# Patient Record
Sex: FEMALE | Race: OTHER | Employment: PART TIME | URBAN - METROPOLITAN AREA
[De-identification: names, ages, dates, MRNs, and addresses within clinical notes are randomized per-mention and may not be internally consistent; named-entity substitution may affect disease eponyms.]

---

## 2024-05-22 DIAGNOSIS — O12.10 PROTEINURIA IN PREGNANCY, ANTEPARTUM: ICD-10-CM

## 2024-05-22 LAB
CREAT UR-MCNC: 316 MG/DL (ref 28–217)
PROT UR-MCNC: 52 MG/DL
PROT/CREAT UR-RTO: 0.2

## 2024-05-23 PROBLEM — O09.93 HIGH-RISK PREGNANCY IN THIRD TRIMESTER: Status: ACTIVE | Noted: 2024-01-16

## 2024-05-27 ENCOUNTER — HOSPITAL ENCOUNTER (INPATIENT)
Age: 24
LOS: 3 days | Discharge: HOME | DRG: 540 | End: 2024-05-30
Attending: OBSTETRICS & GYNECOLOGY | Admitting: OBSTETRICS & GYNECOLOGY
Payer: COMMERCIAL

## 2024-05-27 PROBLEM — Z3A.39 39 WEEKS GESTATION OF PREGNANCY: Status: ACTIVE | Noted: 2024-05-27

## 2024-05-27 PROBLEM — O36.63X0 EXCESSIVE FETAL GROWTH AFFECTING MANAGEMENT OF PREGNANCY IN THIRD TRIMESTER: Status: ACTIVE | Noted: 2024-05-27

## 2024-05-27 LAB
BASOPHILS # BLD: 0 K/UL (ref 0–0.2)
BASOPHILS NFR BLD: 0 % (ref 0–2)
DIFFERENTIAL METHOD BLD: ABNORMAL
EOSINOPHIL # BLD: 0.1 K/UL (ref 0–0.8)
EOSINOPHIL NFR BLD: 1 % (ref 0.5–7.8)
ERYTHROCYTE [DISTWIDTH] IN BLOOD BY AUTOMATED COUNT: 17.6 % (ref 11.9–14.6)
HCT VFR BLD AUTO: 35.3 % (ref 35.8–46.3)
HGB BLD-MCNC: 11.3 G/DL (ref 11.7–15.4)
IMM GRANULOCYTES # BLD AUTO: 0.1 K/UL (ref 0–0.5)
IMM GRANULOCYTES NFR BLD AUTO: 1 % (ref 0–5)
LYMPHOCYTES # BLD: 2.1 K/UL (ref 0.5–4.6)
LYMPHOCYTES NFR BLD: 20 % (ref 13–44)
MCH RBC QN AUTO: 25.9 PG (ref 26.1–32.9)
MCHC RBC AUTO-ENTMCNC: 32 G/DL (ref 31.4–35)
MCV RBC AUTO: 81 FL (ref 82–102)
MONOCYTES # BLD: 0.9 K/UL (ref 0.1–1.3)
MONOCYTES NFR BLD: 8 % (ref 4–12)
NEUTS SEG # BLD: 7.3 K/UL (ref 1.7–8.2)
NEUTS SEG NFR BLD: 70 % (ref 43–78)
NRBC # BLD: 0.03 K/UL (ref 0–0.2)
PLATELET # BLD AUTO: 247 K/UL (ref 150–450)
PMV BLD AUTO: 9.8 FL (ref 9.4–12.3)
RBC # BLD AUTO: 4.36 M/UL (ref 4.05–5.2)
WBC # BLD AUTO: 10.5 K/UL (ref 4.3–11.1)

## 2024-05-27 PROCEDURE — 2580000003 HC RX 258: Performed by: OBSTETRICS & GYNECOLOGY

## 2024-05-27 PROCEDURE — 3E033VJ INTRODUCTION OF OTHER HORMONE INTO PERIPHERAL VEIN, PERCUTANEOUS APPROACH: ICD-10-PCS | Performed by: OBSTETRICS & GYNECOLOGY

## 2024-05-27 PROCEDURE — 85025 COMPLETE CBC W/AUTO DIFF WBC: CPT

## 2024-05-27 PROCEDURE — 6360000002 HC RX W HCPCS: Performed by: OBSTETRICS & GYNECOLOGY

## 2024-05-27 PROCEDURE — 59200 INSERT CERVICAL DILATOR: CPT

## 2024-05-27 PROCEDURE — 6370000000 HC RX 637 (ALT 250 FOR IP): Performed by: OBSTETRICS & GYNECOLOGY

## 2024-05-27 PROCEDURE — 86901 BLOOD TYPING SEROLOGIC RH(D): CPT

## 2024-05-27 PROCEDURE — 86850 RBC ANTIBODY SCREEN: CPT

## 2024-05-27 PROCEDURE — 86780 TREPONEMA PALLIDUM: CPT

## 2024-05-27 PROCEDURE — 1100000000 HC RM PRIVATE

## 2024-05-27 PROCEDURE — 86900 BLOOD TYPING SEROLOGIC ABO: CPT

## 2024-05-27 RX ORDER — SODIUM CHLORIDE 0.9 % (FLUSH) 0.9 %
5-40 SYRINGE (ML) INJECTION PRN
Status: DISCONTINUED | OUTPATIENT
Start: 2024-05-27 | End: 2024-05-28

## 2024-05-27 RX ORDER — SODIUM CHLORIDE, SODIUM LACTATE, POTASSIUM CHLORIDE, AND CALCIUM CHLORIDE .6; .31; .03; .02 G/100ML; G/100ML; G/100ML; G/100ML
1000 INJECTION, SOLUTION INTRAVENOUS PRN
Status: DISCONTINUED | OUTPATIENT
Start: 2024-05-27 | End: 2024-05-28

## 2024-05-27 RX ORDER — ONDANSETRON 4 MG/1
4 TABLET, ORALLY DISINTEGRATING ORAL EVERY 6 HOURS PRN
Status: DISCONTINUED | OUTPATIENT
Start: 2024-05-27 | End: 2024-05-29

## 2024-05-27 RX ORDER — SODIUM CHLORIDE 0.9 % (FLUSH) 0.9 %
5-40 SYRINGE (ML) INJECTION EVERY 12 HOURS SCHEDULED
Status: DISCONTINUED | OUTPATIENT
Start: 2024-05-27 | End: 2024-05-28

## 2024-05-27 RX ORDER — DEXTROSE, SODIUM CHLORIDE, SODIUM LACTATE, POTASSIUM CHLORIDE, AND CALCIUM CHLORIDE 5; .6; .31; .03; .02 G/100ML; G/100ML; G/100ML; G/100ML; G/100ML
INJECTION, SOLUTION INTRAVENOUS CONTINUOUS
Status: DISCONTINUED | OUTPATIENT
Start: 2024-05-27 | End: 2024-05-28

## 2024-05-27 RX ORDER — SODIUM CHLORIDE, SODIUM LACTATE, POTASSIUM CHLORIDE, AND CALCIUM CHLORIDE .6; .31; .03; .02 G/100ML; G/100ML; G/100ML; G/100ML
500 INJECTION, SOLUTION INTRAVENOUS PRN
Status: DISCONTINUED | OUTPATIENT
Start: 2024-05-27 | End: 2024-05-28

## 2024-05-27 RX ORDER — TERBUTALINE SULFATE 1 MG/ML
0.25 INJECTION, SOLUTION SUBCUTANEOUS ONCE
Status: DISCONTINUED | OUTPATIENT
Start: 2024-05-27 | End: 2024-05-28

## 2024-05-27 RX ORDER — ONDANSETRON 2 MG/ML
4 INJECTION INTRAMUSCULAR; INTRAVENOUS EVERY 6 HOURS PRN
Status: DISCONTINUED | OUTPATIENT
Start: 2024-05-27 | End: 2024-05-28

## 2024-05-27 RX ORDER — SODIUM CHLORIDE 9 MG/ML
25 INJECTION, SOLUTION INTRAVENOUS PRN
Status: DISCONTINUED | OUTPATIENT
Start: 2024-05-27 | End: 2024-05-28

## 2024-05-27 RX ADMIN — Medication 50 MCG: at 23:41

## 2024-05-27 RX ADMIN — SODIUM CHLORIDE 5 MILLION UNITS: 900 INJECTION INTRAVENOUS at 23:40

## 2024-05-27 RX ADMIN — SODIUM CHLORIDE, SODIUM LACTATE, POTASSIUM CHLORIDE, CALCIUM CHLORIDE AND DEXTROSE MONOHYDRATE: 5; 600; 310; 30; 20 INJECTION, SOLUTION INTRAVENOUS at 23:39

## 2024-05-28 ENCOUNTER — ANESTHESIA EVENT (OUTPATIENT)
Dept: OPERATING ROOM | Age: 24
DRG: 540 | End: 2024-05-28
Payer: COMMERCIAL

## 2024-05-28 ENCOUNTER — ANESTHESIA (OUTPATIENT)
Dept: OPERATING ROOM | Age: 24
DRG: 540 | End: 2024-05-28
Payer: COMMERCIAL

## 2024-05-28 LAB
ABO + RH BLD: NORMAL
BLOOD GROUP ANTIBODIES SERPL: NORMAL
SPECIMEN EXP DATE BLD: NORMAL
T PALLIDUM AB SER QL IA: NONREACTIVE

## 2024-05-28 PROCEDURE — 2580000003 HC RX 258: Performed by: OBSTETRICS & GYNECOLOGY

## 2024-05-28 PROCEDURE — 2580000003 HC RX 258: Performed by: ANESTHESIOLOGY

## 2024-05-28 PROCEDURE — 36415 COLL VENOUS BLD VENIPUNCTURE: CPT

## 2024-05-28 PROCEDURE — 7100000001 HC PACU RECOVERY - ADDTL 15 MIN: Performed by: OBSTETRICS & GYNECOLOGY

## 2024-05-28 PROCEDURE — 7210000100 HC LABOR FEE PER 1 HR

## 2024-05-28 PROCEDURE — 2500000003 HC RX 250 WO HCPCS

## 2024-05-28 PROCEDURE — 6370000000 HC RX 637 (ALT 250 FOR IP): Performed by: ANESTHESIOLOGY

## 2024-05-28 PROCEDURE — 59514 CESAREAN DELIVERY ONLY: CPT | Performed by: OBSTETRICS & GYNECOLOGY

## 2024-05-28 PROCEDURE — 1100000000 HC RM PRIVATE

## 2024-05-28 PROCEDURE — 6360000002 HC RX W HCPCS

## 2024-05-28 PROCEDURE — 6370000000 HC RX 637 (ALT 250 FOR IP): Performed by: OBSTETRICS & GYNECOLOGY

## 2024-05-28 PROCEDURE — 6360000002 HC RX W HCPCS: Performed by: ANESTHESIOLOGY

## 2024-05-28 PROCEDURE — 7100000000 HC PACU RECOVERY - FIRST 15 MIN: Performed by: OBSTETRICS & GYNECOLOGY

## 2024-05-28 PROCEDURE — 2580000003 HC RX 258

## 2024-05-28 PROCEDURE — 3700000000 HC ANESTHESIA ATTENDED CARE: Performed by: OBSTETRICS & GYNECOLOGY

## 2024-05-28 PROCEDURE — 6360000002 HC RX W HCPCS: Performed by: OBSTETRICS & GYNECOLOGY

## 2024-05-28 PROCEDURE — 10907ZC DRAINAGE OF AMNIOTIC FLUID, THERAPEUTIC FROM PRODUCTS OF CONCEPTION, VIA NATURAL OR ARTIFICIAL OPENING: ICD-10-PCS | Performed by: OBSTETRICS & GYNECOLOGY

## 2024-05-28 PROCEDURE — 2709999900 HC NON-CHARGEABLE SUPPLY: Performed by: OBSTETRICS & GYNECOLOGY

## 2024-05-28 PROCEDURE — 3609079900 HC CESAREAN SECTION: Performed by: OBSTETRICS & GYNECOLOGY

## 2024-05-28 PROCEDURE — 3700000001 HC ADD 15 MINUTES (ANESTHESIA): Performed by: OBSTETRICS & GYNECOLOGY

## 2024-05-28 PROCEDURE — 87255 GENET VIRUS ISOLATE HSV: CPT

## 2024-05-28 RX ORDER — SODIUM CHLORIDE 0.9 % (FLUSH) 0.9 %
5-40 SYRINGE (ML) INJECTION EVERY 12 HOURS SCHEDULED
Status: DISCONTINUED | OUTPATIENT
Start: 2024-05-28 | End: 2024-05-28 | Stop reason: HOSPADM

## 2024-05-28 RX ORDER — ONDANSETRON 2 MG/ML
4 INJECTION INTRAMUSCULAR; INTRAVENOUS ONCE
Status: COMPLETED | OUTPATIENT
Start: 2024-05-28 | End: 2024-05-28

## 2024-05-28 RX ORDER — SODIUM CHLORIDE 0.9 % (FLUSH) 0.9 %
5-40 SYRINGE (ML) INJECTION EVERY 12 HOURS SCHEDULED
Status: DISCONTINUED | OUTPATIENT
Start: 2024-05-28 | End: 2024-05-28

## 2024-05-28 RX ORDER — SODIUM CHLORIDE 0.9 % (FLUSH) 0.9 %
5-40 SYRINGE (ML) INJECTION EVERY 12 HOURS SCHEDULED
Status: DISCONTINUED | OUTPATIENT
Start: 2024-05-28 | End: 2024-05-30 | Stop reason: HOSPADM

## 2024-05-28 RX ORDER — HYDROMORPHONE HYDROCHLORIDE 1 MG/ML
1 INJECTION, SOLUTION INTRAMUSCULAR; INTRAVENOUS; SUBCUTANEOUS EVERY 4 HOURS PRN
Status: DISPENSED | OUTPATIENT
Start: 2024-05-28 | End: 2024-05-29

## 2024-05-28 RX ORDER — LANOLIN
CREAM (ML) TOPICAL
Status: DISCONTINUED | OUTPATIENT
Start: 2024-05-28 | End: 2024-05-30 | Stop reason: HOSPADM

## 2024-05-28 RX ORDER — SODIUM CHLORIDE, SODIUM LACTATE, POTASSIUM CHLORIDE, CALCIUM CHLORIDE 600; 310; 30; 20 MG/100ML; MG/100ML; MG/100ML; MG/100ML
INJECTION, SOLUTION INTRAVENOUS CONTINUOUS
Status: DISCONTINUED | OUTPATIENT
Start: 2024-05-28 | End: 2024-05-28 | Stop reason: HOSPADM

## 2024-05-28 RX ORDER — NALOXONE HYDROCHLORIDE 0.4 MG/ML
INJECTION, SOLUTION INTRAMUSCULAR; INTRAVENOUS; SUBCUTANEOUS PRN
Status: DISCONTINUED | OUTPATIENT
Start: 2024-05-28 | End: 2024-05-30 | Stop reason: HOSPADM

## 2024-05-28 RX ORDER — BUPIVACAINE HYDROCHLORIDE 7.5 MG/ML
INJECTION, SOLUTION INTRASPINAL
Status: COMPLETED | OUTPATIENT
Start: 2024-05-28 | End: 2024-05-28

## 2024-05-28 RX ORDER — LIDOCAINE HYDROCHLORIDE 10 MG/ML
1 INJECTION, SOLUTION INFILTRATION; PERINEURAL
Status: DISCONTINUED | OUTPATIENT
Start: 2024-05-28 | End: 2024-05-28 | Stop reason: HOSPADM

## 2024-05-28 RX ORDER — SODIUM CHLORIDE, SODIUM LACTATE, POTASSIUM CHLORIDE, CALCIUM CHLORIDE 600; 310; 30; 20 MG/100ML; MG/100ML; MG/100ML; MG/100ML
INJECTION, SOLUTION INTRAVENOUS CONTINUOUS
Status: DISCONTINUED | OUTPATIENT
Start: 2024-05-28 | End: 2024-05-30 | Stop reason: HOSPADM

## 2024-05-28 RX ORDER — ONDANSETRON 2 MG/ML
4 INJECTION INTRAMUSCULAR; INTRAVENOUS EVERY 6 HOURS PRN
Status: DISPENSED | OUTPATIENT
Start: 2024-05-28 | End: 2024-05-29

## 2024-05-28 RX ORDER — CARBOPROST TROMETHAMINE 250 UG/ML
250 INJECTION, SOLUTION INTRAMUSCULAR PRN
Status: DISCONTINUED | OUTPATIENT
Start: 2024-05-28 | End: 2024-05-28

## 2024-05-28 RX ORDER — KETOROLAC TROMETHAMINE 30 MG/ML
INJECTION, SOLUTION INTRAMUSCULAR; INTRAVENOUS PRN
Status: DISCONTINUED | OUTPATIENT
Start: 2024-05-28 | End: 2024-05-28 | Stop reason: SDUPTHER

## 2024-05-28 RX ORDER — CITRIC ACID/SODIUM CITRATE 334-500MG
30 SOLUTION, ORAL ORAL ONCE
Status: COMPLETED | OUTPATIENT
Start: 2024-05-28 | End: 2024-05-28

## 2024-05-28 RX ORDER — KETOROLAC TROMETHAMINE 30 MG/ML
30 INJECTION, SOLUTION INTRAMUSCULAR; INTRAVENOUS EVERY 6 HOURS PRN
Status: DISPENSED | OUTPATIENT
Start: 2024-05-28 | End: 2024-05-29

## 2024-05-28 RX ORDER — SODIUM CHLORIDE 0.9 % (FLUSH) 0.9 %
5-40 SYRINGE (ML) INJECTION PRN
Status: DISCONTINUED | OUTPATIENT
Start: 2024-05-28 | End: 2024-05-28 | Stop reason: HOSPADM

## 2024-05-28 RX ORDER — MORPHINE SULFATE 0.5 MG/ML
INJECTION, SOLUTION EPIDURAL; INTRATHECAL; INTRAVENOUS
Status: COMPLETED | OUTPATIENT
Start: 2024-05-28 | End: 2024-05-28

## 2024-05-28 RX ORDER — OXYCODONE HYDROCHLORIDE 5 MG/1
5 TABLET ORAL EVERY 6 HOURS PRN
Status: DISPENSED | OUTPATIENT
Start: 2024-05-28 | End: 2024-05-29

## 2024-05-28 RX ORDER — ACETAMINOPHEN 325 MG/1
650 TABLET ORAL EVERY 6 HOURS PRN
Status: DISPENSED | OUTPATIENT
Start: 2024-05-28 | End: 2024-05-29

## 2024-05-28 RX ORDER — DIPHENHYDRAMINE HYDROCHLORIDE 50 MG/ML
12.5 INJECTION INTRAMUSCULAR; INTRAVENOUS EVERY 6 HOURS PRN
Status: ACTIVE | OUTPATIENT
Start: 2024-05-28 | End: 2024-05-29

## 2024-05-28 RX ORDER — PROCHLORPERAZINE EDISYLATE 5 MG/ML
5 INJECTION INTRAMUSCULAR; INTRAVENOUS EVERY 6 HOURS PRN
Status: DISPENSED | OUTPATIENT
Start: 2024-05-28 | End: 2024-05-29

## 2024-05-28 RX ORDER — SODIUM CHLORIDE 0.9 % (FLUSH) 0.9 %
5-40 SYRINGE (ML) INJECTION PRN
Status: DISCONTINUED | OUTPATIENT
Start: 2024-05-28 | End: 2024-05-28

## 2024-05-28 RX ORDER — SODIUM CHLORIDE 9 MG/ML
INJECTION, SOLUTION INTRAVENOUS PRN
Status: DISCONTINUED | OUTPATIENT
Start: 2024-05-28 | End: 2024-05-28 | Stop reason: HOSPADM

## 2024-05-28 RX ORDER — SODIUM CHLORIDE 9 MG/ML
INJECTION, SOLUTION INTRAVENOUS PRN
Status: DISCONTINUED | OUTPATIENT
Start: 2024-05-28 | End: 2024-05-28

## 2024-05-28 RX ORDER — MISOPROSTOL 200 UG/1
400 TABLET ORAL PRN
Status: DISCONTINUED | OUTPATIENT
Start: 2024-05-28 | End: 2024-05-30 | Stop reason: HOSPADM

## 2024-05-28 RX ORDER — SODIUM CHLORIDE 0.9 % (FLUSH) 0.9 %
5-40 SYRINGE (ML) INJECTION PRN
Status: DISCONTINUED | OUTPATIENT
Start: 2024-05-28 | End: 2024-05-30 | Stop reason: HOSPADM

## 2024-05-28 RX ORDER — METHYLERGONOVINE MALEATE 0.2 MG/ML
INJECTION INTRAVENOUS PRN
Status: DISCONTINUED | OUTPATIENT
Start: 2024-05-28 | End: 2024-05-28 | Stop reason: SDUPTHER

## 2024-05-28 RX ORDER — TRANEXAMIC ACID 10 MG/ML
1000 INJECTION, SOLUTION INTRAVENOUS
Status: DISPENSED | OUTPATIENT
Start: 2024-05-28 | End: 2024-05-29

## 2024-05-28 RX ORDER — EPHEDRINE SULFATE/0.9% NACL/PF 50 MG/5 ML
SYRINGE (ML) INTRAVENOUS PRN
Status: DISCONTINUED | OUTPATIENT
Start: 2024-05-28 | End: 2024-05-28 | Stop reason: SDUPTHER

## 2024-05-28 RX ORDER — OXYCODONE HYDROCHLORIDE 5 MG/1
10 TABLET ORAL PRN
Status: ACTIVE | OUTPATIENT
Start: 2024-05-28 | End: 2024-05-29

## 2024-05-28 RX ORDER — SODIUM CHLORIDE, SODIUM LACTATE, POTASSIUM CHLORIDE, CALCIUM CHLORIDE 600; 310; 30; 20 MG/100ML; MG/100ML; MG/100ML; MG/100ML
INJECTION, SOLUTION INTRAVENOUS CONTINUOUS
Status: DISCONTINUED | OUTPATIENT
Start: 2024-05-28 | End: 2024-05-28

## 2024-05-28 RX ORDER — METHYLERGONOVINE MALEATE 0.2 MG/ML
INJECTION INTRAVENOUS PRN
Status: DISCONTINUED | OUTPATIENT
Start: 2024-05-28 | End: 2024-05-28

## 2024-05-28 RX ADMIN — ACETAMINOPHEN 650 MG: 325 TABLET, FILM COATED ORAL at 22:14

## 2024-05-28 RX ADMIN — SODIUM CITRATE AND CITRIC ACID MONOHYDRATE 30 ML: 334; 500 SOLUTION ORAL at 11:08

## 2024-05-28 RX ADMIN — CEFAZOLIN 2000 MG: 10 INJECTION, POWDER, FOR SOLUTION INTRAVENOUS at 11:08

## 2024-05-28 RX ADMIN — Medication 10 MG: at 12:12

## 2024-05-28 RX ADMIN — BUPIVACAINE HYDROCHLORIDE IN DEXTROSE 12 MG: 7.5 INJECTION, SOLUTION SUBARACHNOID at 11:38

## 2024-05-28 RX ADMIN — SODIUM CHLORIDE, SODIUM LACTATE, POTASSIUM CHLORIDE, AND CALCIUM CHLORIDE: 600; 310; 30; 20 INJECTION, SOLUTION INTRAVENOUS at 11:29

## 2024-05-28 RX ADMIN — SODIUM CHLORIDE, SODIUM LACTATE, POTASSIUM CHLORIDE, CALCIUM CHLORIDE AND DEXTROSE MONOHYDRATE 125 ML/HR: 5; 600; 310; 30; 20 INJECTION, SOLUTION INTRAVENOUS at 07:57

## 2024-05-28 RX ADMIN — Medication 10 MG: at 11:44

## 2024-05-28 RX ADMIN — OXYTOCIN 1 MILLI-UNITS/MIN: 10 INJECTION, SOLUTION INTRAMUSCULAR; INTRAVENOUS at 07:59

## 2024-05-28 RX ADMIN — Medication 10 MG: at 12:18

## 2024-05-28 RX ADMIN — Medication 50 MCG: at 03:56

## 2024-05-28 RX ADMIN — KETOROLAC TROMETHAMINE 30 MG: 30 INJECTION, SOLUTION INTRAMUSCULAR; INTRAVENOUS at 12:20

## 2024-05-28 RX ADMIN — Medication 10 MG: at 11:57

## 2024-05-28 RX ADMIN — HYDROMORPHONE HYDROCHLORIDE 1 MG: 1 INJECTION, SOLUTION INTRAMUSCULAR; INTRAVENOUS; SUBCUTANEOUS at 14:20

## 2024-05-28 RX ADMIN — PHENYLEPHRINE HYDROCHLORIDE 100 MCG: 10 INJECTION INTRAVENOUS at 12:12

## 2024-05-28 RX ADMIN — PHENYLEPHRINE HYDROCHLORIDE 100 MCG: 10 INJECTION INTRAVENOUS at 12:18

## 2024-05-28 RX ADMIN — Medication 10 MG: at 11:48

## 2024-05-28 RX ADMIN — ONDANSETRON 4 MG: 2 INJECTION INTRAMUSCULAR; INTRAVENOUS at 11:08

## 2024-05-28 RX ADMIN — PROCHLORPERAZINE EDISYLATE 5 MG: 5 INJECTION INTRAMUSCULAR; INTRAVENOUS at 15:26

## 2024-05-28 RX ADMIN — Medication 500 ML/HR: at 12:01

## 2024-05-28 RX ADMIN — METHYLERGONOVINE MALEATE 200 MCG: 0.2 INJECTION, SOLUTION INTRAMUSCULAR; INTRAVENOUS at 12:04

## 2024-05-28 RX ADMIN — SODIUM CHLORIDE 2.5 MILLION UNITS: 9 INJECTION, SOLUTION INTRAVENOUS at 03:50

## 2024-05-28 RX ADMIN — ACETAMINOPHEN 650 MG: 325 TABLET, FILM COATED ORAL at 16:44

## 2024-05-28 RX ADMIN — MISOPROSTOL 400 MCG: 200 TABLET ORAL at 12:07

## 2024-05-28 RX ADMIN — SODIUM CHLORIDE, POTASSIUM CHLORIDE, SODIUM LACTATE AND CALCIUM CHLORIDE 125 ML/HR: 600; 310; 30; 20 INJECTION, SOLUTION INTRAVENOUS at 13:00

## 2024-05-28 RX ADMIN — PHENYLEPHRINE HYDROCHLORIDE 100 MCG: 10 INJECTION INTRAVENOUS at 11:57

## 2024-05-28 RX ADMIN — SODIUM CHLORIDE 2.5 MILLION UNITS: 9 INJECTION, SOLUTION INTRAVENOUS at 07:56

## 2024-05-28 RX ADMIN — MORPHINE SULFATE 0.15 MG: 0.5 INJECTION, SOLUTION EPIDURAL; INTRATHECAL; INTRAVENOUS at 11:38

## 2024-05-28 RX ADMIN — KETOROLAC TROMETHAMINE 30 MG: 30 INJECTION, SOLUTION INTRAMUSCULAR at 18:00

## 2024-05-28 RX ADMIN — Medication 10 MG: at 11:47

## 2024-05-28 ASSESSMENT — PAIN DESCRIPTION - LOCATION
LOCATION: ABDOMEN;INCISION
LOCATION: ABDOMEN
LOCATION: ABDOMEN

## 2024-05-28 ASSESSMENT — PAIN SCALES - GENERAL
PAINLEVEL_OUTOF10: 4
PAINLEVEL_OUTOF10: 5
PAINLEVEL_OUTOF10: 5
PAINLEVEL_OUTOF10: 3

## 2024-05-28 ASSESSMENT — PAIN DESCRIPTION - DESCRIPTORS
DESCRIPTORS: ACHING
DESCRIPTORS: SORE
DESCRIPTORS: BURNING;CRAMPING

## 2024-05-28 ASSESSMENT — PAIN DESCRIPTION - ORIENTATION
ORIENTATION: ANTERIOR;LOWER
ORIENTATION: ANTERIOR;LOWER

## 2024-05-28 NOTE — PROGRESS NOTES
Patient/caregivers speak Khmer  as their preferred language for their healthcare communication. For safe communication, use the [facility]  carts or call:     Senior /Navigator Carole Dong at [phone] or   [facility] phone services for [facility] at [phone]          Thank you,           Carole MCKAY  Senior /Navigator

## 2024-05-28 NOTE — PLAN OF CARE
Problem: Vaginal Birth or  Section  Goal: Fetal and maternal status remain reassuring during the birth process  Description:  Birth OB-Pregnancy care plan goal which identifies if the fetal and maternal status remain reassuring during the birth process  Outcome: Progressing     Problem: Pain  Goal: Verbalizes/displays adequate comfort level or baseline comfort level  Outcome: Progressing     Problem: Infection - Adult  Goal: Absence of infection during hospitalization  Outcome: Progressing  Goal: Absence of fever/infection during anticipated neutropenic period  Outcome: Progressing     Problem: Safety - Adult  Goal: Free from fall injury  Outcome: Progressing     Problem: Chronic Conditions and Co-morbidities  Goal: Patient's chronic conditions and co-morbidity symptoms are monitored and maintained or improved  Outcome: Progressing     
Discharge Planning  Goal: Discharge to home or other facility with appropriate resources  Outcome: Progressing     Problem: Chronic Conditions and Co-morbidities  Goal: Patient's chronic conditions and co-morbidity symptoms are monitored and maintained or improved  5/28/2024 1005 by Cyndi Carmona, RN  Outcome: Progressing  5/27/2024 2237 by Sana Garcia, RN  Outcome: Progressing

## 2024-05-28 NOTE — ANESTHESIA POSTPROCEDURE EVALUATION
Department of Anesthesiology  Postprocedure Note    Patient: Magnolia Caceres  MRN: [id number]  YOB: 2000  Date of evaluation: 2024    Procedure Summary       Date: 24 Room / Location: [facility] L&D OR  [facility] L&D    Anesthesia Start: 1129 Anesthesia Stop: 1235    Procedure:  SECTION (Abdomen) Diagnosis:        delivery, delivered, current hospitalization      ( delivery, delivered, current hospitalization [O82])    Surgeons: Gomez Song MD Responsible Provider: Tesfaye Wilhelm MD    Anesthesia Type: spinal ASA Status: 2            Anesthesia Type: No value filed.    Debbie Phase I: Debbie Score: 9    Debbie Phase II: Debbie Score: 10    Anesthesia Post Evaluation    Patient location during evaluation: floor  Patient participation: complete - patient participated  Level of consciousness: awake and alert  Airway patency: patent  Nausea & Vomiting: no nausea and no vomiting  Cardiovascular status: hemodynamically stable  Respiratory status: acceptable, nonlabored ventilation and spontaneous ventilation  Hydration status: euvolemic  Comments: BP (!) 125/91   Pulse 73   Temp 98.2 °F (36.8 °C) (Oral)   Resp 18   SpO2 98%     Multimodal analgesia pain management approach  Pain management: adequate and satisfactory to patient        No notable events documented.

## 2024-05-28 NOTE — PROGRESS NOTES
Dr. Song at bedside discussing plan of care. Lesion found on labia during SVE per provider. Patient currently being educated and discussion being made on c/s via interpretor. Patient agrees with plan of care to have c/s, due to unknown lesion at this time. All questions and concerns addressed.

## 2024-05-28 NOTE — ANESTHESIA PROCEDURE NOTES
Spinal Block    Patient location during procedure: OR  End time: 5/28/2024 11:43 AM  Reason for block: primary anesthetic  Staffing  Performed: anesthesiologist   Anesthesiologist: Tesfaye Wilhelm MD  Performed by: Tesfaye Wilhelm MD  Authorized by: Tesfaye Wilhelm MD    Spinal Block  Patient position: sitting  Prep: ChloraPrep  Patient monitoring: cardiac monitor, continuous pulse ox, frequent blood pressure checks and oxygen  Approach: midline  Location: L4/L5  Provider prep: mask and sterile gloves  Needle  Needle type: pencil-tip   Needle gauge: 25 G  Needle length: 3.5 in  Assessment  Sensory level: T8  Swirl obtained: Yes  CSF: clear  Attempts: 1  Hemodynamics: stable  Additional Notes  Time out at 1138  Preanesthetic Checklist  Completed: patient identified, IV checked, risks and benefits discussed, surgical/procedural consents, equipment checked, pre-op evaluation, timeout performed, anesthesia consent given, oxygen available and monitors applied/VS acknowledged

## 2024-05-28 NOTE — OP NOTE
[facility]  [address], [place] [address]  [phone]    Gomez Song MD, FACOG  Abena Victoria [facility]  Lorena Heck MD, FACOG    Magnolia Caceres  2000    Preop Dx:   1. IUP @ 39 2/7 weeks gestation   2. 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  Vtx position.  APGARS 8,9.  Weight:  10 lbs, 6 oz.  Normal appearing uterus, tubes and ovaries.    Procedure:  Pt was taken to the operating room where spinal anesthesia was found to be adequate.  She was then prepped and draped in the usual sterile fashion and placed in the supine position with a leftward tilt.  A Pfannenstiel skin incision was made with the scalpel and carried down to the underlying layer of fascia with the bovie.  The fascia was incised in the midline and the incision extended laterally with the booker scissors. Superior of the fascial incision was grasped with Kocher clamps and  from the rectus muscles sharply and bluntly.  In a similar fashion, the inferior aspect of the fascial incision was grasped with the Kocher clamps and  from the rectus muscles sharply and bluntly.  The rectus muscles were  in the midline bluntly and peritoneum entered bluntly.  The peritoneal incision was extended manually.  Bladder blade was inserted and lower uterine incision made with the scalpel.  Incision extended manually laterally.  Infants head delivered atraumatically.  Nose and mouth suctioned.  Cord clamped and cut.  Infant handed off to the waiting NICU staff.  The uterus was exteriorized and cleared of all clots and debris.  Hysterotomy was closed with 0-vicryl in a running, locking fashion.  A second layer of 0-vicryl was placed in a interrupted figure of 8 fashion to imbricate the incision.  The pelvis and gutters were cleared

## 2024-05-28 NOTE — L&D DELIVERY NOTE
Micheal Caceres [[id number]]      Labor Events     Labor: No   Steroids: None  Cervical Ripening Date/Time:  24 23:41:00   Cervical Ripening Type: Misoprostol  Antibiotics Received during Labor: Yes  Rupture Date/Time:  24 11:59:29   Rupture Type: AROM  Fluid Color: Clear  Fluid Odor: None  Fluid Volume: Moderate              Anesthesia    Method: Spinal       Delivery Details      Delivery Date: 24 Delivery Time: 12:00:00   Delivery Type: , Low Transverse  Trial of Labor?: Yes   Categorization: Primary   Priority: unscheduled              Shoulder Dystocia    Shoulder Dystocia Present?: No       Cord    Vessels: 3 Vessels  Complications: None  Cord Clamped Date/Time: 2024 12:00:59  Cord Blood Disposition: Lab              Placenta    Date/Time: 2024 12:01:00  Removal: Manual Removal  Appearance: Intact  Disposition: Discarded       Lacerations           Blood Loss  Mother: Magnolia Caceres #[id number]     Start of Mother's Information      Delivery Blood Loss  24 1133 - 24 1221      None                 End of Mother's Information  Mother: Magnolia Caceres #[id number]                Delivery Providers    Delivering clinician:      Provider Role     Obstetrician     Primary Nurse     Primary Ledger Nurse     Charge Nurse     Scrub Tech              Ledger Assessment    Living Status: Living  Delivery Location Comment: [phone]        Skin Color:   Heart Rate:   Reflex Irritability:   Muscle Tone:   Respiratory Effort:   Total:            1 Minute:    0    2    2    2    2    8         5 Minute:    1    2    2    2    2    9                                        Apgars Assigned By: SAÚL OLMOS RN              Resuscitation    Method: Bulb Suction, Stimulation             Ledger Measurements      Birth Weight: 4710 g   Birth Length: 53 cm     Head Circumference: 36 cm     Chest Circumference: 37 cm

## 2024-05-28 NOTE — LACTATION NOTE
This note was copied from a baby's chart.  In to see mom and infant for first time. Mom having hard time staying awake during visit. Dad and mom speak Ecuadorean. Family member at bedside wanted to interpret instead of using computer as offered. Mom tried to latch baby after birth but infant was impatient per family and did not want to latch, baby has since then had formula 2 times. One of those time was just before entering room, baby took 15 mls. Mom's plan is to do both breast and bottle feed formula. Encouraged to offer breast first q 2-3 hrs, both sides, and then if infant still hungry or doesn't latch, can offer formula after per her plan. They had no questions or needs at this time. Lactation to follow up in am.

## 2024-05-28 NOTE — PROGRESS NOTES
[facility]  [address], [place] [address]  [phone]    Gomez Song MD, FACOG  Abena Victoria [facility]    Labor Progress Note    Pt tolerating induction/labor well.    Vitals:    05/28/24 0738 05/28/24 0802 05/28/24 0850 05/28/24 0903   BP: 117/70 128/76 135/65 (!) 113/58   Pulse: 83 78 87 85   Resp: 16      Temp: 98.5 °F (36.9 °C)      TempSrc: Oral      SpO2: 98%        FHT's:  Baseline 's, reactive  [place]:  ctx q 2-4 min    I presented to room to AROM patient.  Upon exam, ulcerated lesion noted on left mid labia.  With  (Guille #[id number]), I asked patient about HSV Hx.  Pt does not have known Hx.  She did report she gets these occasionally, they are painful but they eventually resolve.  Chart reviewed and no lab results noted.  SF lab called and no rapid testing available, only send out testing.  D/W pt at length that appears to be HSV on exam but I cannot confirm today.  D/W her that I recommend proceeding with primary C/S due to the potential neg effects of delivery vaginally with active HSV (fetal infection, encephalitis, fetal death, etc). Pt and  wish to proceed with C/S  D/W pt at length risks/benefits of procedure including but not limited to death, bleeding, infection, damage to other internal organs and possible treatment failure. She exhibited full understanding and wishes to proceed.        Gomez Song MD  9:22 AM  05/28/24

## 2024-05-28 NOTE — PROGRESS NOTES
/Cultural Navigator rounded in person and assessed patient's language needs.  Patient stated that communication was satisfactory via  services. An opportunity for questions and clarifications were provided. Provided interpreting services for Cyndi Carmona RN and Michelle Galeana , Birth Certificate specialist. Language services interpreting resources were offered as needed.       Thank you,          Carole MCKAY  Senior /Navigator   Language Services Department  [phone] (phone)

## 2024-05-29 ENCOUNTER — ANESTHESIA EVENT (OUTPATIENT)
Dept: MOTHER INFANT UNIT | Age: 24
DRG: 540 | End: 2024-05-29
Payer: COMMERCIAL

## 2024-05-29 ENCOUNTER — ANESTHESIA (OUTPATIENT)
Dept: MOTHER INFANT UNIT | Age: 24
DRG: 540 | End: 2024-05-29
Payer: COMMERCIAL

## 2024-05-29 LAB
ERYTHROCYTE [DISTWIDTH] IN BLOOD BY AUTOMATED COUNT: 17.4 % (ref 11.9–14.6)
HCT VFR BLD AUTO: 30.3 % (ref 35.8–46.3)
HGB BLD-MCNC: 9.6 G/DL (ref 11.7–15.4)
MCH RBC QN AUTO: 25.9 PG (ref 26.1–32.9)
MCHC RBC AUTO-ENTMCNC: 31.7 G/DL (ref 31.4–35)
MCV RBC AUTO: 81.9 FL (ref 82–102)
NRBC # BLD: 0 K/UL (ref 0–0.2)
PLATELET # BLD AUTO: 219 K/UL (ref 150–450)
PMV BLD AUTO: 9.8 FL (ref 9.4–12.3)
RBC # BLD AUTO: 3.7 M/UL (ref 4.05–5.2)
WBC # BLD AUTO: 10.1 K/UL (ref 4.3–11.1)

## 2024-05-29 PROCEDURE — 36415 COLL VENOUS BLD VENIPUNCTURE: CPT

## 2024-05-29 PROCEDURE — 2580000003 HC RX 258: Performed by: ANESTHESIOLOGY

## 2024-05-29 PROCEDURE — 6360000002 HC RX W HCPCS: Performed by: ANESTHESIOLOGY

## 2024-05-29 PROCEDURE — 85027 COMPLETE CBC AUTOMATED: CPT

## 2024-05-29 PROCEDURE — 6370000000 HC RX 637 (ALT 250 FOR IP): Performed by: OBSTETRICS & GYNECOLOGY

## 2024-05-29 PROCEDURE — 86694 HERPES SIMPLEX NES ANTBDY: CPT

## 2024-05-29 PROCEDURE — 86696 HERPES SIMPLEX TYPE 2 TEST: CPT

## 2024-05-29 PROCEDURE — 86695 HERPES SIMPLEX TYPE 1 TEST: CPT

## 2024-05-29 PROCEDURE — 6370000000 HC RX 637 (ALT 250 FOR IP): Performed by: ANESTHESIOLOGY

## 2024-05-29 PROCEDURE — 1100000000 HC RM PRIVATE

## 2024-05-29 RX ORDER — DOCUSATE SODIUM 100 MG/1
100 CAPSULE, LIQUID FILLED ORAL 2 TIMES DAILY
Status: DISCONTINUED | OUTPATIENT
Start: 2024-05-29 | End: 2024-05-30 | Stop reason: HOSPADM

## 2024-05-29 RX ORDER — ACYCLOVIR 200 MG/1
400 CAPSULE ORAL 3 TIMES DAILY
Status: DISCONTINUED | OUTPATIENT
Start: 2024-05-29 | End: 2024-05-30 | Stop reason: HOSPADM

## 2024-05-29 RX ORDER — ACETAMINOPHEN 500 MG
1000 TABLET ORAL EVERY 8 HOURS
Status: DISCONTINUED | OUTPATIENT
Start: 2024-05-29 | End: 2024-05-30 | Stop reason: HOSPADM

## 2024-05-29 RX ORDER — PRENATAL VIT/IRON FUM/FOLIC AC 27MG-0.8MG
1 TABLET ORAL DAILY
Status: DISCONTINUED | OUTPATIENT
Start: 2024-05-29 | End: 2024-05-30 | Stop reason: HOSPADM

## 2024-05-29 RX ORDER — ONDANSETRON 4 MG/1
4 TABLET, ORALLY DISINTEGRATING ORAL EVERY 8 HOURS PRN
Status: DISCONTINUED | OUTPATIENT
Start: 2024-05-29 | End: 2024-05-30 | Stop reason: HOSPADM

## 2024-05-29 RX ORDER — METHYLERGONOVINE MALEATE 0.2 MG/ML
200 INJECTION INTRAVENOUS ONCE AS NEEDED
Status: DISCONTINUED | OUTPATIENT
Start: 2024-05-29 | End: 2024-05-30 | Stop reason: HOSPADM

## 2024-05-29 RX ORDER — SIMETHICONE 80 MG
80 TABLET,CHEWABLE ORAL EVERY 6 HOURS PRN
Status: DISCONTINUED | OUTPATIENT
Start: 2024-05-29 | End: 2024-05-30 | Stop reason: HOSPADM

## 2024-05-29 RX ORDER — FERROUS SULFATE 325(65) MG
325 TABLET ORAL 2 TIMES DAILY WITH MEALS
Status: DISCONTINUED | OUTPATIENT
Start: 2024-05-29 | End: 2024-05-30 | Stop reason: HOSPADM

## 2024-05-29 RX ORDER — OXYCODONE HYDROCHLORIDE 5 MG/1
5 TABLET ORAL EVERY 4 HOURS PRN
Status: DISCONTINUED | OUTPATIENT
Start: 2024-05-29 | End: 2024-05-30 | Stop reason: HOSPADM

## 2024-05-29 RX ORDER — SODIUM CHLORIDE 9 MG/ML
INJECTION, SOLUTION INTRAVENOUS PRN
Status: DISCONTINUED | OUTPATIENT
Start: 2024-05-29 | End: 2024-05-30 | Stop reason: HOSPADM

## 2024-05-29 RX ORDER — OXYCODONE HYDROCHLORIDE 5 MG/1
10 TABLET ORAL EVERY 4 HOURS PRN
Status: DISCONTINUED | OUTPATIENT
Start: 2024-05-29 | End: 2024-05-30 | Stop reason: HOSPADM

## 2024-05-29 RX ORDER — FAMOTIDINE 20 MG/1
20 TABLET, FILM COATED ORAL 2 TIMES DAILY
Status: DISCONTINUED | OUTPATIENT
Start: 2024-05-29 | End: 2024-05-30 | Stop reason: HOSPADM

## 2024-05-29 RX ORDER — ONDANSETRON 2 MG/ML
4 INJECTION INTRAMUSCULAR; INTRAVENOUS EVERY 6 HOURS PRN
Status: DISCONTINUED | OUTPATIENT
Start: 2024-05-29 | End: 2024-05-30 | Stop reason: HOSPADM

## 2024-05-29 RX ORDER — MISOPROSTOL 200 UG/1
200 TABLET ORAL EVERY 6 HOURS PRN
Status: DISCONTINUED | OUTPATIENT
Start: 2024-05-29 | End: 2024-05-30 | Stop reason: HOSPADM

## 2024-05-29 RX ORDER — IBUPROFEN 800 MG/1
800 TABLET ORAL EVERY 8 HOURS
Status: DISCONTINUED | OUTPATIENT
Start: 2024-05-29 | End: 2024-05-30 | Stop reason: HOSPADM

## 2024-05-29 RX ADMIN — ACYCLOVIR 400 MG: 200 CAPSULE ORAL at 10:18

## 2024-05-29 RX ADMIN — DOCUSATE SODIUM 100 MG: 100 CAPSULE, LIQUID FILLED ORAL at 15:53

## 2024-05-29 RX ADMIN — IBUPROFEN 800 MG: 800 TABLET, FILM COATED ORAL at 23:31

## 2024-05-29 RX ADMIN — ACETAMINOPHEN 650 MG: 325 TABLET, FILM COATED ORAL at 04:10

## 2024-05-29 RX ADMIN — ACETAMINOPHEN 1000 MG: 500 TABLET, FILM COATED ORAL at 16:55

## 2024-05-29 RX ADMIN — KETOROLAC TROMETHAMINE 30 MG: 30 INJECTION, SOLUTION INTRAMUSCULAR at 09:46

## 2024-05-29 RX ADMIN — OXYCODONE 5 MG: 5 TABLET ORAL at 06:53

## 2024-05-29 RX ADMIN — KETOROLAC TROMETHAMINE 30 MG: 30 INJECTION, SOLUTION INTRAMUSCULAR at 01:33

## 2024-05-29 RX ADMIN — ACYCLOVIR 400 MG: 200 CAPSULE ORAL at 21:06

## 2024-05-29 RX ADMIN — FERROUS SULFATE TAB 325 MG (65 MG ELEMENTAL FE) 325 MG: 325 (65 FE) TAB at 15:48

## 2024-05-29 RX ADMIN — SODIUM CHLORIDE, PRESERVATIVE FREE 10 ML: 5 INJECTION INTRAVENOUS at 09:00

## 2024-05-29 RX ADMIN — FAMOTIDINE 20 MG: 20 TABLET, FILM COATED ORAL at 21:06

## 2024-05-29 RX ADMIN — PRENATAL VIT W/ FE FUMARATE-FA TAB 27-0.8 MG 1 TABLET: 27-0.8 TAB at 16:55

## 2024-05-29 RX ADMIN — OXYCODONE 5 MG: 5 TABLET ORAL at 13:36

## 2024-05-29 RX ADMIN — ACYCLOVIR 400 MG: 200 CAPSULE ORAL at 15:48

## 2024-05-29 RX ADMIN — DOCUSATE SODIUM 100 MG: 100 CAPSULE, LIQUID FILLED ORAL at 21:06

## 2024-05-29 RX ADMIN — IBUPROFEN 800 MG: 800 TABLET, FILM COATED ORAL at 15:47

## 2024-05-29 ASSESSMENT — PAIN DESCRIPTION - LOCATION
LOCATION: ABDOMEN

## 2024-05-29 ASSESSMENT — PAIN SCALES - GENERAL
PAINLEVEL_OUTOF10: 6
PAINLEVEL_OUTOF10: 7
PAINLEVEL_OUTOF10: 7
PAINLEVEL_OUTOF10: 2
PAINLEVEL_OUTOF10: 3

## 2024-05-29 ASSESSMENT — PAIN DESCRIPTION - ORIENTATION
ORIENTATION: ANTERIOR;LOWER
ORIENTATION: ANTERIOR;LOWER

## 2024-05-29 ASSESSMENT — PAIN DESCRIPTION - DESCRIPTORS
DESCRIPTORS: ACHING;CRAMPING
DESCRIPTORS: ACHING;CRAMPING

## 2024-05-29 NOTE — ANESTHESIA POST-OP
Anesthesiology  Post-op Note    Post-op day 1 s/p  via spinal with neuraxial opioids for post-op pain management.  /61   Pulse 94   Temp 98.4 °F (36.9 °C) (Oral)   Resp 18   SpO2 97%   Breastfeeding Unknown   Airway patent, patient appropriately hydrated and appears euvolemic.  Patient is Alert and oriented.  Pain is well controlled.  Pruritus is well controlled.  Nausea is well controlled.  No complaints about back or site of injection.  Motor and sensory function has returned to baseline in lower extremities. Patient is satisfied with anesthetic and reports no complications.  Continue current orders, then initiate surgeon's orders for pain management 24 hours after .  Follow up per surgeon.

## 2024-05-29 NOTE — PROGRESS NOTES
POD 1 Primary LTCD  due to concern for active labial HSV    S:  Pt doing well this AM.  Pain controlled w/po meds.  Lochia scant.  + Ambulation.  Tolerating regular diet.  + Flatus.     Vitals:    24 0751   BP: 119/61   Pulse: 94   Resp: 18   Temp: 98.4 °F (36.9 °C)   SpO2: 97%       I/O last 3 completed shifts:  In: 807.4 [I.V.:807.4]  Out: 2795 [Urine:1700; Emesis/NG output:100; Blood:995]  No intake/output data recorded.       Physical exam:  General: A&Ox3, NAD   Cardiovascular: RRR  Respiratory: Normal effort  Abdomen: fundus firm, non-distended, incision with dressing in place   Ext: trace edema, no calf tenderness    A/P:   Hb 11.3/35-> 9.6/30.3    Concern for HSV infection, patient still not sure if that is the case, culture and Ab pending, will treat with acyclovir  Although the  recommends that caution be exercised when administering acyclovir to patients who are breastfeeding, acyclovir is considered compatible with breastfeeding (ACOG 2020; WHO 2002). Patients with HSV infection taking acyclovir may breastfeed as long as there are not lesions on the breast, body lesions are covered, and strict hand hygiene is practiced;     Continue routine pp care

## 2024-05-29 NOTE — PROGRESS NOTES
Patient/caregivers speak Yakut  as their preferred language for their healthcare communication. For safe communication, use the [facility]  carts or call:     Senior /Navigator Carole Dong at [phone] or   [facility] phone services for [facility] at [phone]          Thank you,           Carole MCKAY  Senior /Navigator

## 2024-05-29 NOTE — PROGRESS NOTES
Offered patient interpreting stick for translation.  Patient refused and prefers her sister who is at bedside to translate.  Patient able to answer and understands in english most of communication.  Sister available for clarifying.  Discussed Plan of Care, assessment, bleeding expectations and expectations for baby first 24 hours, verbalized understanding.    Shift assessment complete as noted. Patient resting comfortably. Questions encouraged and answered. Encouraged to call for needs or concerns. Verbalizes understanding.

## 2024-05-30 VITALS
OXYGEN SATURATION: 97 % | TEMPERATURE: 98.6 F | SYSTOLIC BLOOD PRESSURE: 116 MMHG | RESPIRATION RATE: 15 BRPM | DIASTOLIC BLOOD PRESSURE: 69 MMHG | HEART RATE: 85 BPM

## 2024-05-30 LAB
HSV SPEC CULT: NEGATIVE
SPECIMEN SOURCE: NORMAL

## 2024-05-30 PROCEDURE — 6370000000 HC RX 637 (ALT 250 FOR IP): Performed by: OBSTETRICS & GYNECOLOGY

## 2024-05-30 RX ORDER — ACYCLOVIR 200 MG/1
400 CAPSULE ORAL 3 TIMES DAILY
Qty: 18 CAPSULE | Refills: 0 | Status: SHIPPED | OUTPATIENT
Start: 2024-05-30 | End: 2024-06-02

## 2024-05-30 RX ORDER — OXYCODONE HYDROCHLORIDE 5 MG/1
5 TABLET ORAL EVERY 8 HOURS PRN
Qty: 14 TABLET | Refills: 0 | Status: SHIPPED | OUTPATIENT
Start: 2024-05-30 | End: 2024-06-06

## 2024-05-30 RX ORDER — IBUPROFEN 800 MG/1
800 TABLET ORAL EVERY 8 HOURS PRN
Qty: 120 TABLET | Refills: 0 | Status: SHIPPED | OUTPATIENT
Start: 2024-05-30

## 2024-05-30 RX ADMIN — IBUPROFEN 800 MG: 800 TABLET, FILM COATED ORAL at 09:09

## 2024-05-30 RX ADMIN — DOCUSATE SODIUM 100 MG: 100 CAPSULE, LIQUID FILLED ORAL at 09:09

## 2024-05-30 RX ADMIN — ACYCLOVIR 400 MG: 200 CAPSULE ORAL at 09:09

## 2024-05-30 RX ADMIN — ACETAMINOPHEN 1000 MG: 500 TABLET, FILM COATED ORAL at 03:29

## 2024-05-30 RX ADMIN — PRENATAL VIT W/ FE FUMARATE-FA TAB 27-0.8 MG 1 TABLET: 27-0.8 TAB at 09:09

## 2024-05-30 RX ADMIN — FERROUS SULFATE TAB 325 MG (65 MG ELEMENTAL FE) 325 MG: 325 (65 FE) TAB at 09:09

## 2024-05-30 NOTE — DISCHARGE INSTRUCTIONS
Parto por cesárea: Qué esperar en el hogar   Section: What to Expect at Home  Harris recuperación     Un parto por cesárea, o simplemente cesárea, es bindu cirugía mediante la cual se da a manpreet a un bebé a través de un mayelin, llamado incisión, que hace el médico en la parte baja del abdomen y el útero.  Es posible que sienta dolor en la parte baja del abdomen y que necesite analgésicos (medicamentos para el dolor) betzaida 1 o 2 semanas. Puede esperar tener algo de sangrado vaginal betzaida varias semanas. Es probable que necesite unas 6 semanas para recuperarse completamente.  Es importante que se tome las cosas con calma mientras rabia la incisión. Evite levantar objetos pesados, hacer actividades vigorosas o hacer ejercicios que pudieran esforzar los músculos abdominales mientras se recupera. Pídale a un familiar o amigo que la ayude con las tareas domésticas, la comida y las compras.  Esta hoja de cuidados le da bindu idea general del tiempo que le llevará recuperarse. Sin embargo, cada persona se recupera a un ritmo diferente. Siga los pasos que se mencionan a continuación para recuperarse lo más rápido posible.  ¿Cómo puede cuidarse en el [facility]?  Actividad    Descanse cuando se sienta cansada. Dormir lo suficiente la ayudará a recuperarse.     Intente caminar todos los días. Comience caminando un poco más de lo que caminó el día anterior. Poco a poco, aumente la distancia. Caminar mejora el flujo de adalid y ayuda a prevenir la neumonía, el estreñimiento y los coágulos de adalid.     Evite las actividades intensas, basilia montar en bicicleta, trotar, levantar pesas y hacer ejercicios aeróbicos betzaida 6 semanas o hasta que harris médico lo apruebe.     Hasta que harris médico lo apruebe, no levante nada más pesado que harris bebé.     No hannah abdominales ni ningún otro ejercicio que utilice los músculos del abdomen betzaida 6 semanas o hasta que harris médico lo apruebe.     Sostenga bindu almohada sobre la incisión al toser o

## 2024-05-30 NOTE — PROGRESS NOTES
Patient/caregivers speak Yi  as their preferred language for their healthcare communication. For safe communication, use the [facility]  carts or call:     Senior /Navigator Carole Dong at [phone] or   [facility] phone services for [facility] at [phone]          Thank you,           Carole MCKAY  Senior /Navigator

## 2024-05-30 NOTE — PROGRESS NOTES
Post-Operative Day Number 2 Progress Note  Magnolia Caceres  [id number]    Patient doing well post-op day 2 from  delivery without significant complaints.  Pain controlled on current medication.  Voiding without difficulty, normal lochia. Tolerating regular diet    Vitals:  Patient Vitals for the past 24 hrs:   BP Temp Temp src Pulse Resp SpO2   24 0721 116/69 98.6 °F (37 °C) -- 85 15 97 %   24 2344 113/63 97.3 °F (36.3 °C) Oral 87 20 96 %   24 1555 125/63 98.2 °F (36.8 °C) -- 94 18 97 %     Temp (24hrs), Av °F (36.7 °C), Min:97.3 °F (36.3 °C), Max:98.6 °F (37 °C)      Vital signs stable, afebrile.    Exam:  Patient without distress.               Abdomen soft, fundus firm at level of umbilicus, nontender.  Incision dry and                      clean without erythema.            Labs: No results found for this or any previous visit (from the past 24 hour(s)).    Assessment and Plan:  Patient appears to be having uncomplicated post- course.  Continue routine post-op care and maternal education.    C/s for probable HSV lesion noted during labor  Hsv studies pending  But pt also had very large baby. 10-6 at 39wks. Not a known GDM pt. Had passed glucola  +/+  Hgb 11.3-->9.6  Wants to go home today  Pt seen with Cam #[id number]

## 2024-05-30 NOTE — H&P
[facility]  [address], [place] [address]  [phone]    Gomez Song MD, FACOG  Abena Victoria [facility]  Lorena Heck MD, FACOG      [facility]      Assessment/Plan    Patient Active Problem List    Diagnosis Date Noted    Vitamin D deficiency 2023     Priority: Medium     delivery delivered 2024    Excessive fetal growth affecting management of pregnancy in third trimester 2024    39 weeks gestation of pregnancy 2024    Positive GBS test 2024     Overview Note:     NKA      Uses Swazi as primary spoken language 2024    Carrier of spinal muscular atrophy 2024     Overview Note:     24 [facility]: One copy SMA      Single episode of elevated blood pressure 2024     Overview Note:     24 [facility]: initial /78. Repeat was 122/82. Will obtain labs for recurrent or persistent hypertension.  24 [facility]: BP reassuring today        High-risk pregnancy in third trimester 2024     Overview Note:     2024 at [facility]: Normal anatomy/echo, MATTY WNL, Negative NIPT. For full details review formal ultrasound report.  24 [facility]: Reassuring fetal status. Growth today appropriate EFW appropriate AC; For full details review formal ultrasound report.   - 2024 38w3d EFW = 3742grams / 82%, AC=94%.  AF=20.  HC/AC ratio wnl.          Obesity during pregnancy 2023        Subjective    Magnolia Bohrquez 24 y.o.  39w3d  presented to L&D for term IOL per primary OB, Dr. Bryant. Pt has no complaints today. Pt denies vaginal bleeding/discharge. No LOF.  +FM.      OB History    Para Term  AB Living   2 1 1   1 1   SAB IAB Ectopic Molar Multiple Live Births   1       0 1      # Outcome Date GA Lbr Claudy/2nd Weight Sex Delivery Anes PTL Lv   2 Term 24 39w3d  4.71 kg (10 lb 6.1 oz) F CS-LTranv Spinal N JEANETTE   1 SAB 2021               Past Medical History:   Diagnosis Date    Headache     hx

## 2024-05-30 NOTE — PROGRESS NOTES
services used for discharge teaching for mom and baby (Noe #[id number], session #[id number]).  Discharge teaching provided in Serbian and left with mother. Mother verbalized understanding and will call out when infant is in car seat and they are ready to leave.

## 2024-05-30 NOTE — PROGRESS NOTES
Shift assessment complete as noted. Patient resting comfortably. Questions encouraged and answered. Encouraged to call for needs or concerns. Verbalizes understanding.

## 2024-05-30 NOTE — CARE COORDINATION
Chart reviewed - first time parent; Chadian speaking.  SW met with patient to complete initial assessment with  [id number] (Den).     provided education on [facility]partum [facility] Home Visit.  Family would like to participate in program.  Referral made to [facility]  home visit program.    Patient is not currently receiving WIC.   provided education on WIC program.  Phone # to schedule appointment: [phone].  Brochure provided in Chadian.    Patient given informational packet on  mood & anxiety disorders (resources/education) - in Chadian.    Family denies any additional needs from  at this time.  Family has 's contact information should any needs/questions arise.    JUANA Mcgill-ANURAG, [facility]  [facility]   [phone]

## 2024-05-30 NOTE — DISCHARGE SUMMARY
Discharge Summary:     Date of Admission:  2024 10:31 PM  Date of Discharge:  2024      Patient is a 24 y.o.  at 39w3d wks who was admitted for IOL. The indication for  was probable HSV lesion.  A Low Transverse  was performed.   On post-op day #1, the patient had her catheter removed and was ambulating well in the de jesus.  Her post operative hemoglobin was stable. Patient had a normal post operative course.  Incision stayed clean and dry, without erythema.  Patient remained afebrile throughout the entire hospital stay.  On day of discharge, she was discharged home in good condition with routine post  instructions.  She was breast feeding the infant and plans on undecided for birth control. Pt was scheduled to follow up in two weeks for an incision check.     HTN diagnosis: no     Discharge Meds:       Medication List        START taking these medications      acyclovir 200 MG capsule  Commonly known as: ZOVIRAX  Take 2 capsules by mouth 3 times daily for 3 days     ibuprofen 800 MG tablet  Commonly known as: ADVIL;MOTRIN  Take 1 tablet by mouth every 8 hours as needed for Pain     oxyCODONE 5 MG immediate release tablet  Commonly known as: ROXICODONE  Take 1 tablet by mouth every 8 hours as needed for Pain for up to 7 days. Max Daily Amount: 15 mg            CONTINUE taking these medications      famotidine 20 MG tablet  Commonly known as: PEPCID  Take 1 tablet by mouth 2 times daily     Prenatal Vitamin/Min +DHA 27-0.8-200 MG Caps     vitamin D 1.25 MG (32887 UT) Caps capsule  Commonly known as: ERGOCALCIFEROL  Take 1 capsule by mouth once a week            STOP taking these medications      folic acid 1 MG tablet  Commonly known as: FOLVITE               Where to Get Your Medications        These medications were sent to [facility]/pharmacy #[id number] - [place] - [address] [facility] [phone] - F [phone]  [address] [place] [address]      Hours: 24-hours Phone:

## 2024-05-31 LAB
HSV1 IGG SER IA-ACNC: <0.91 INDEX (ref 0–0.9)
HSV2 IGG SER IA-ACNC: <0.91 INDEX (ref 0–0.9)

## 2024-06-06 LAB — HERPES SIMPLEX VIRUS 1/2 IGM: NORMAL

## 2024-06-12 ENCOUNTER — POSTPARTUM VISIT (OUTPATIENT)
Dept: OBGYN CLINIC | Age: 24
End: 2024-06-12
Payer: COMMERCIAL

## 2024-06-12 VITALS — SYSTOLIC BLOOD PRESSURE: 120 MMHG | BODY MASS INDEX: 35.56 KG/M2 | WEIGHT: 194.4 LBS | DIASTOLIC BLOOD PRESSURE: 78 MMHG

## 2024-06-12 PROCEDURE — 99213 OFFICE O/P EST LOW 20 MIN: CPT | Performed by: OBSTETRICS & GYNECOLOGY

## 2024-06-12 NOTE — PROGRESS NOTES
PPClinic note    Magnolia Caceres is a 24 y.o.  PPD # 2 weeks s/p Primary LTCD on 2024 for concern for Herpes active outbreak and macrosomia.  She present today for postpartum follow-up  Breast and Bottle feeding  Lochia less than menses  Mood stable  Normal bladder and bowel function  Declines contraception today  Pashto interpretor used today    Vitals:    24 0736   BP: 120/78      PE  Gen: NAD   Pulm: Normal effort  Abd: Soft, non tender to palpation, fundus less than umbilicus, incision c/d/ i  Extr: No edema, NTTP    Magnolia Caceres is a 24 y.o.  postpartum  --RTC in 4 weeks for final 6 week visit

## 2024-07-08 RX ORDER — IBUPROFEN 800 MG/1
800 TABLET, FILM COATED ORAL EVERY 8 HOURS PRN
Qty: 120 TABLET | Refills: 0 | OUTPATIENT
Start: 2024-07-08

## 2024-07-17 ENCOUNTER — POSTPARTUM VISIT (OUTPATIENT)
Dept: OBGYN CLINIC | Age: 24
End: 2024-07-17
Payer: COMMERCIAL

## 2024-07-17 VITALS — SYSTOLIC BLOOD PRESSURE: 126 MMHG | BODY MASS INDEX: 36.03 KG/M2 | DIASTOLIC BLOOD PRESSURE: 72 MMHG | WEIGHT: 197 LBS

## 2024-07-17 NOTE — PROGRESS NOTES
PPClinic note    Magnolia Caceres is a 24 y.o.  PPD # 7 weeks s/p Primary LTCD on 2023.  She present today for postpartum follow-up  Mood stable  Normal bladder and bowel function  Declines contraception    Vitals:    24 0942   BP: 126/72      PE  Gen: NAD   Pulm: Normal effort   Abd: Soft, non tender to palpation, fundus less than umbilicus, incision c/d/i  Extr: No edema, NTTP    Magnolia Caceres is a 24 y.o.  PPD # pp  --RTC in 1 year annual

## 2024-07-18 DIAGNOSIS — O99.613 GASTROESOPHAGEAL REFLUX DURING PREGNANCY IN THIRD TRIMESTER, ANTEPARTUM: ICD-10-CM

## 2024-07-18 DIAGNOSIS — K21.9 GASTROESOPHAGEAL REFLUX DURING PREGNANCY IN THIRD TRIMESTER, ANTEPARTUM: ICD-10-CM

## 2024-07-18 RX ORDER — FAMOTIDINE 20 MG/1
20 TABLET, FILM COATED ORAL 2 TIMES DAILY
Qty: 60 TABLET | Refills: 3 | Status: SHIPPED | OUTPATIENT
Start: 2024-07-18

## 2024-07-18 RX ORDER — FAMOTIDINE 20 MG/1
20 TABLET, FILM COATED ORAL 2 TIMES DAILY
Qty: 180 TABLET | Refills: 1 | OUTPATIENT
Start: 2024-07-18

## 2024-12-03 ENCOUNTER — OFFICE VISIT (OUTPATIENT)
Dept: OBGYN CLINIC | Age: 24
End: 2024-12-03
Payer: COMMERCIAL

## 2024-12-03 VITALS
WEIGHT: 217 LBS | SYSTOLIC BLOOD PRESSURE: 112 MMHG | HEIGHT: 62 IN | BODY MASS INDEX: 39.93 KG/M2 | DIASTOLIC BLOOD PRESSURE: 74 MMHG

## 2024-12-03 DIAGNOSIS — Z86.19 HISTORY OF DENGUE: ICD-10-CM

## 2024-12-03 DIAGNOSIS — Z09 FOLLOW-UP EXAM: Primary | ICD-10-CM

## 2024-12-03 DIAGNOSIS — N92.6 LATE MENSES: ICD-10-CM

## 2024-12-03 LAB
HCG SERPL-ACNC: <1 MIU/ML
HIV 1+2 AB+HIV1 P24 AG SERPL QL IA: NONREACTIVE
HIV 1/2 RESULT COMMENT: NORMAL

## 2024-12-03 PROCEDURE — 99213 OFFICE O/P EST LOW 20 MIN: CPT | Performed by: NURSE PRACTITIONER

## 2024-12-03 NOTE — PROGRESS NOTES
not bruise/bleed easily.   Psychiatric/Behavioral: Negative for depression and suicidal ideas. The patient is not nervous/anxious.   All other systems reviewed and are negative.         PHYSICAL EXAM:  /74   Ht 1.575 m (5' 2\")   Wt 98.4 kg (217 lb)   LMP 2024   BMI 39.69 kg/m²        Physical Exam:  Constitutional: She appears well-developed and well-nourished. No distress.   HENT:    Head: Normocephalic and atraumatic.   Cardiovascular: Regular pulse   Pulmonary/Chest: Effort normal  Skin: She is not diaphoretic.   Psychiatric: She has a normal mood and affect. Her behavior is normal. Thought content normal. .    Counseling:  Patient counseled face to face for more than 50% of the total time spent with the patient.  On this date I have spent 28 minutes reviewing previous notes, test results, and face to face with the patient discussing the diagnosis and importance of compliance with the treatment plan as well as documenting.         ASSESSMENT/PLAN:   24 y.o., , who is seen today for ER follow up.    -Lengthy discussion with patient in regards to ER visit and follow up today. Informed her that we can collect HIV again today and if reactive, further testing will be performed for confirmation. Informed her should confirmatory tests be positive, will report to [facility] at that time and [facility] would discuss treatment options if needed. She is agreeable with this. Also discussed menses and how LMP was . Patient states menses normally every 30 days but can be a week late. Recommended collecting Quant Hcg level to ensure wnl since above test being collected and she is agreeable.    -HIV collected   -Quant Hcg collected    -RTO as needed and will schedule annual exam (as no pap smear results in [facility])       Diagnosis Orders   1. Follow-up exam        2. History of dengue  HIV 1/2 Ag/Ab, 4TH Generation,W Rflx Confirm      3. Late menses  HCG, Quantitative, Pregnancy          No problem-specific

## 2024-12-06 ENCOUNTER — TELEPHONE (OUTPATIENT)
Dept: OBGYN CLINIC | Age: 24
End: 2024-12-06

## 2025-02-06 ENCOUNTER — OFFICE VISIT (OUTPATIENT)
Dept: INTERNAL MEDICINE CLINIC | Facility: CLINIC | Age: 25
End: 2025-02-06
Payer: COMMERCIAL

## 2025-02-06 VITALS
BODY MASS INDEX: 38.26 KG/M2 | DIASTOLIC BLOOD PRESSURE: 64 MMHG | HEART RATE: 72 BPM | HEIGHT: 62 IN | SYSTOLIC BLOOD PRESSURE: 110 MMHG | TEMPERATURE: 98 F | WEIGHT: 207.9 LBS | OXYGEN SATURATION: 98 %

## 2025-02-06 DIAGNOSIS — E78.5 DYSLIPIDEMIA: ICD-10-CM

## 2025-02-06 DIAGNOSIS — E55.9 VITAMIN D DEFICIENCY: ICD-10-CM

## 2025-02-06 DIAGNOSIS — Z00.00 ROUTINE GENERAL MEDICAL EXAMINATION AT A HEALTH CARE FACILITY: Primary | ICD-10-CM

## 2025-02-06 DIAGNOSIS — Z83.3 FAMILY HISTORY OF DIABETES MELLITUS: ICD-10-CM

## 2025-02-06 DIAGNOSIS — N63.23 MASS OF LOWER OUTER QUADRANT OF LEFT BREAST: ICD-10-CM

## 2025-02-06 LAB
25(OH)D3 SERPL-MCNC: 14 NG/ML (ref 30–100)
ALBUMIN SERPL-MCNC: 4 G/DL (ref 3.5–5)
ALBUMIN/GLOB SERPL: 1.2 (ref 1–1.9)
ALP SERPL-CCNC: 95 U/L (ref 35–104)
ALT SERPL-CCNC: 23 U/L (ref 8–45)
ANION GAP SERPL CALC-SCNC: 10 MMOL/L (ref 7–16)
AST SERPL-CCNC: 24 U/L (ref 15–37)
BASOPHILS # BLD: 0.03 K/UL (ref 0–0.2)
BASOPHILS NFR BLD: 0.3 % (ref 0–2)
BILIRUB SERPL-MCNC: 0.3 MG/DL (ref 0–1.2)
BUN SERPL-MCNC: 13 MG/DL (ref 6–23)
CALCIUM SERPL-MCNC: 9.4 MG/DL (ref 8.8–10.2)
CHLORIDE SERPL-SCNC: 103 MMOL/L (ref 98–107)
CHOLEST SERPL-MCNC: 150 MG/DL (ref 0–200)
CO2 SERPL-SCNC: 24 MMOL/L (ref 20–29)
CREAT SERPL-MCNC: 0.49 MG/DL (ref 0.6–1.1)
DIFFERENTIAL METHOD BLD: NORMAL
EOSINOPHIL # BLD: 0.25 K/UL (ref 0–0.8)
EOSINOPHIL NFR BLD: 2.9 % (ref 0.5–7.8)
ERYTHROCYTE [DISTWIDTH] IN BLOOD BY AUTOMATED COUNT: 14.3 % (ref 11.9–14.6)
EST. AVERAGE GLUCOSE BLD GHB EST-MCNC: 109 MG/DL
GLOBULIN SER CALC-MCNC: 3.4 G/DL (ref 2.3–3.5)
GLUCOSE SERPL-MCNC: 93 MG/DL (ref 70–99)
HBA1C MFR BLD: 5.4 % (ref 0–5.6)
HCT VFR BLD AUTO: 40.5 % (ref 35.8–46.3)
HDLC SERPL-MCNC: 29 MG/DL (ref 40–60)
HDLC SERPL: 5.2 (ref 0–5)
HGB BLD-MCNC: 12.7 G/DL (ref 11.7–15.4)
IMM GRANULOCYTES # BLD AUTO: 0.02 K/UL (ref 0–0.5)
IMM GRANULOCYTES NFR BLD AUTO: 0.2 % (ref 0–5)
LDLC SERPL CALC-MCNC: 87 MG/DL (ref 0–100)
LYMPHOCYTES # BLD: 2.02 K/UL (ref 0.5–4.6)
LYMPHOCYTES NFR BLD: 23.5 % (ref 13–44)
MCH RBC QN AUTO: 26.5 PG (ref 26.1–32.9)
MCHC RBC AUTO-ENTMCNC: 31.4 G/DL (ref 31.4–35)
MCV RBC AUTO: 84.6 FL (ref 82–102)
MONOCYTES # BLD: 0.53 K/UL (ref 0.1–1.3)
MONOCYTES NFR BLD: 6.2 % (ref 4–12)
NEUTS SEG # BLD: 5.74 K/UL (ref 1.7–8.2)
NEUTS SEG NFR BLD: 66.9 % (ref 43–78)
NRBC # BLD: 0 K/UL (ref 0–0.2)
PLATELET # BLD AUTO: 327 K/UL (ref 150–450)
PMV BLD AUTO: 9.8 FL (ref 9.4–12.3)
POTASSIUM SERPL-SCNC: 4 MMOL/L (ref 3.5–5.1)
PROT SERPL-MCNC: 7.4 G/DL (ref 6.3–8.2)
RBC # BLD AUTO: 4.79 M/UL (ref 4.05–5.2)
SODIUM SERPL-SCNC: 138 MMOL/L (ref 136–145)
TRIGL SERPL-MCNC: 173 MG/DL (ref 0–150)
TSH, 3RD GENERATION: 1.16 UIU/ML (ref 0.27–4.2)
VLDLC SERPL CALC-MCNC: 35 MG/DL (ref 6–23)
WBC # BLD AUTO: 8.6 K/UL (ref 4.3–11.1)

## 2025-02-06 PROCEDURE — 99395 PREV VISIT EST AGE 18-39: CPT | Performed by: NURSE PRACTITIONER

## 2025-02-06 RX ORDER — ERGOCALCIFEROL 1.25 MG/1
50000 CAPSULE, LIQUID FILLED ORAL WEEKLY
Qty: 12 CAPSULE | Refills: 1 | Status: SHIPPED | OUTPATIENT
Start: 2025-02-06

## 2025-02-06 SDOH — ECONOMIC STABILITY: FOOD INSECURITY: WITHIN THE PAST 12 MONTHS, YOU WORRIED THAT YOUR FOOD WOULD RUN OUT BEFORE YOU GOT MONEY TO BUY MORE.: NEVER TRUE

## 2025-02-06 SDOH — ECONOMIC STABILITY: FOOD INSECURITY: WITHIN THE PAST 12 MONTHS, THE FOOD YOU BOUGHT JUST DIDN'T LAST AND YOU DIDN'T HAVE MONEY TO GET MORE.: NEVER TRUE

## 2025-02-06 ASSESSMENT — PATIENT HEALTH QUESTIONNAIRE - PHQ9
1. LITTLE INTEREST OR PLEASURE IN DOING THINGS: NOT AT ALL
SUM OF ALL RESPONSES TO PHQ QUESTIONS 1-9: 0
SUM OF ALL RESPONSES TO PHQ9 QUESTIONS 1 & 2: 0
2. FEELING DOWN, DEPRESSED OR HOPELESS: NOT AT ALL
SUM OF ALL RESPONSES TO PHQ QUESTIONS 1-9: 0

## 2025-02-06 ASSESSMENT — ENCOUNTER SYMPTOMS
ABDOMINAL PAIN: 0
SHORTNESS OF BREATH: 0
SORE THROAT: 0
COUGH: 0
SINUS PAIN: 0
CONSTIPATION: 0
NAUSEA: 0
RHINORRHEA: 0
BACK PAIN: 0
EYE PAIN: 0
DIARRHEA: 0
VOMITING: 0

## 2025-02-06 NOTE — PATIENT INSTRUCTIONS
Welcome our practice and to our [employer] [facility] family.  Thank you for choosing us as your health care provider.  In the coming days, you may receive a survey about your visit via text or email.  Please take a few minutes to answer these questions.   As our patient, we value you opinion and appreciate your feedback   about your [employer] [employer] experience.      Thank you    [facility]  [phone]  [place]             Please arrive 20 minutes prior to your appointment time to allow time for checking in at the  and rooming with the medical assistant. Please bring your medication bottles at each visit.

## 2025-02-06 NOTE — PROGRESS NOTES
[facility]  [address]  [facility] [address]  Tel# [phone]  Fax# [phone]     Jess Stephens DNP, FNP-BC  Family Nurse Practitioner            Date of Visit: 2025     Magnolia Caceres (: 2000) is a 25 y.o. female  established patient, here for evaluation of the following chief complaint(s):    Chief Complaint   Patient presents with    Annual Exam     Patient is here for her annual physical. Patient is fasting for labwork.   Andree #[id number]         Patient Care Team:  Jess Stephens APRN - CNP as PCP - General (Nurse Practitioner, Family)  Jess Stephens APRN - CNP as PCP - Empaneled Provider         History of Present Illness          [place] [employer]:   [place]hto Andree  #[id number]      Physical  Presents here for physical with fasting labs.      LMP:  1/15/2025          Hyperlipidemia      Diet  Eating more veges  B - 2-3 eggs, 2 small tortillas  L - broccoli, steak; chicken  D - sandwich     Physical activity: walking, dancing    C/o weight gain after pregnancy          Lab Results   Component Value Date    CHOL 217 (H) 2024    CHOL 146 2023     Lab Results   Component Value Date    TRIG 206 (H) 2024    TRIG 179 (H) 2023     Lab Results   Component Value Date    HDL 69 (H) 2024    HDL 30 (L) 2023     Lab Results   Component Value Date    .8 (H) 2024    LDL 80.2 2023     Lab Results   Component Value Date    VLDL 41.2 (H) 2024    VLDL 35.8 (H) 2023     Lab Results   Component Value Date    CHOLHDLRATIO 3.1 2024    CHOLHDLRATIO 4.9 2023            Vitamin D      Lab Results   Component Value Date/Time    VITD25 16.8 2024 09:20 AM             HM      Last pap: about a year ago per pt      There is no immunization history for the selected administration types on file for this patient.           Social History     Substance and Sexual Activity   Alcohol Use Never        Tobacco Use: Low

## 2025-02-20 ENCOUNTER — HOSPITAL ENCOUNTER (OUTPATIENT)
Dept: MAMMOGRAPHY | Age: 25
Discharge: HOME | End: 2025-02-23
Payer: COMMERCIAL

## 2025-02-20 ENCOUNTER — APPOINTMENT (OUTPATIENT)
Dept: MAMMOGRAPHY | Age: 25
End: 2025-02-20
Payer: COMMERCIAL

## 2025-02-20 DIAGNOSIS — N63.23 MASS OF LOWER OUTER QUADRANT OF LEFT BREAST: ICD-10-CM

## 2025-02-20 PROCEDURE — 76642 ULTRASOUND BREAST LIMITED: CPT

## (undated) DEVICE — PENCIL ES L3M BTTN SWCH S STL HEX LOK BLDE ELECTRD HOLSTER

## (undated) DEVICE — STAPLER SKIN SQ 30 ABSRB STPL DISP INSORB ORDER VIA PHONE OR EMAIL

## (undated) DEVICE — TRAY CATH 16FR PVC INTMIT STR TIP PREATTACH TO 1000ML COLL

## (undated) DEVICE — ELECTRODE PT RET AD L9FT HI MOIST COND ADH HYDRGEL CORDED

## (undated) DEVICE — SUTURE VICRYL SZ 0 L36IN ABSRB UD L36MM CT-1 1/2 CIR J946H

## (undated) DEVICE — SUTURE PDS II SZ 0 L27IN ABSRB VLT L36MM CT-1 1/2 CIR Z340H

## (undated) DEVICE — Device: Brand: PORTEX

## (undated) DEVICE — 3M™ STERI-STRIP™ REINFORCED ADHESIVE SKIN CLOSURES, R1547, 1/2 IN X 4 IN (12 MM X 100 MM), 6 STRIPS/ENVELOPE: Brand: 3M™ STERI-STRIP™

## (undated) DEVICE — SOLUTION IRRIG 1000ML H2O STRL BLT

## (undated) DEVICE — SUTURE MONOCRYL SZ 4-0 L27IN ABSRB UD L19MM PS-2 1/2 CIR PRIM Y426H

## (undated) DEVICE — STERILE POLYISOPRENE POWDER-FREE SURGICAL GLOVES: Brand: PROTEXIS

## (undated) DEVICE — TRAY PREP DRY W/ PREM GLV 2 APPL 6 SPNG 2 UNDPD 1 OVERWRAP

## (undated) DEVICE — PENCIL SMK EVAC TELSCP 4.5 M TBNG

## (undated) DEVICE — AMD ANTIMICROBIAL GAUZE SPONGES,12 PLY USP TYPE VII, 0.2% POLYHEXAMETHYLENE BIGUANIDE HCI (PHMB): Brand: CURITY

## (undated) DEVICE — TUBING, SUCTION, 1/4" X 10', STRAIGHT: Brand: MEDLINE

## (undated) DEVICE — SUTURE MONOCRYL SZ 3-0 L27IN ABSRB UD L60MM KS STR REV CUT Y523H

## (undated) DEVICE — AMD ANTIMICROBIAL NON-ADHERENT PAD,0.2% POLYHEXAMETHYLENE BIGUANIDE HCI (PHMB): Brand: TELFA

## (undated) DEVICE — SUREFIT, DUAL DISPERSIVE ELECTRODE, CONTACT QUALITY MONITOR: Brand: SUREFIT

## (undated) DEVICE — SOLUTION IV 1000ML 0.9% SOD CHL

## (undated) DEVICE — SURGICAL PROCEDURE PACK C SECT CDS

## (undated) DEVICE — SUTURE VICRYL RAPIDE SZ 3-0 L36IN ABSRB UD L36MM CT-1 1/2 CIR VR944

## (undated) DEVICE — KENDALL SCD EXPRESS SLEEVES, KNEE LENGTH, MEDIUM: Brand: KENDALL SCD